# Patient Record
Sex: FEMALE | Race: WHITE | NOT HISPANIC OR LATINO | Employment: UNEMPLOYED | URBAN - NONMETROPOLITAN AREA
[De-identification: names, ages, dates, MRNs, and addresses within clinical notes are randomized per-mention and may not be internally consistent; named-entity substitution may affect disease eponyms.]

---

## 2017-10-23 ENCOUNTER — HOSPITAL ENCOUNTER (OUTPATIENT)
Dept: GENERAL RADIOLOGY | Facility: HOSPITAL | Age: 42
Discharge: HOME | End: 2017-10-23
Attending: INTERNAL MEDICINE | Admitting: INTERNAL MEDICINE

## 2017-10-23 DIAGNOSIS — R06.02 SOB (SHORTNESS OF BREATH): ICD-10-CM

## 2017-10-23 DIAGNOSIS — R06.02 SOB (SHORTNESS OF BREATH): Primary | ICD-10-CM

## 2017-10-23 PROCEDURE — 71020 HC CHEST PA AND LATERAL: CPT

## 2017-10-23 PROCEDURE — 71020 XR CHEST PA AND LATERAL: CPT | Performed by: RADIOLOGY

## 2017-10-24 ENCOUNTER — OFFICE VISIT (OUTPATIENT)
Dept: PULMONOLOGY | Facility: CLINIC | Age: 42
End: 2017-10-24

## 2017-10-24 VITALS
BODY MASS INDEX: 35.79 KG/M2 | DIASTOLIC BLOOD PRESSURE: 62 MMHG | OXYGEN SATURATION: 97 % | TEMPERATURE: 98.4 F | SYSTOLIC BLOOD PRESSURE: 100 MMHG | WEIGHT: 228 LBS | HEIGHT: 67 IN | HEART RATE: 86 BPM

## 2017-10-24 DIAGNOSIS — J44.9 CHRONIC OBSTRUCTIVE PULMONARY DISEASE, UNSPECIFIED COPD TYPE (HCC): Primary | ICD-10-CM

## 2017-10-24 LAB
FEV1: 2.07 LITERS
FVC VOL RESPIRATORY: 3.01 LITERS

## 2017-10-24 PROCEDURE — 99203 OFFICE O/P NEW LOW 30 MIN: CPT | Performed by: INTERNAL MEDICINE

## 2017-10-24 PROCEDURE — 94010 BREATHING CAPACITY TEST: CPT | Performed by: INTERNAL MEDICINE

## 2017-10-24 RX ORDER — DIAZEPAM 5 MG/1
5 TABLET ORAL 2 TIMES DAILY PRN
COMMUNITY

## 2017-10-24 RX ORDER — ASPIRIN 81 MG/1
TABLET ORAL
Refills: 4 | COMMUNITY
Start: 2017-10-07 | End: 2022-09-26

## 2017-10-24 RX ORDER — ALBUTEROL SULFATE 90 UG/1
AEROSOL, METERED RESPIRATORY (INHALATION)
Refills: 1 | COMMUNITY
Start: 2017-10-07 | End: 2022-09-26

## 2017-10-24 RX ORDER — ATORVASTATIN CALCIUM 80 MG/1
80 TABLET, FILM COATED ORAL DAILY
COMMUNITY
End: 2022-09-26

## 2017-10-24 RX ORDER — NITROGLYCERIN 2.5 MG/1
2.5 CAPSULE ORAL 3 TIMES DAILY
COMMUNITY
End: 2022-09-26

## 2017-10-24 RX ORDER — IBUPROFEN 200 MG
200 TABLET ORAL EVERY 6 HOURS PRN
COMMUNITY

## 2017-10-24 RX ORDER — CETIRIZINE HYDROCHLORIDE 10 MG/1
10 TABLET ORAL DAILY
COMMUNITY

## 2017-10-24 RX ORDER — ATORVASTATIN CALCIUM 80 MG/1
80 TABLET, FILM COATED ORAL DAILY
Refills: 4 | COMMUNITY
Start: 2017-10-07

## 2017-10-24 RX ORDER — ALBUTEROL SULFATE 0.63 MG/3ML
1 SOLUTION RESPIRATORY (INHALATION) EVERY 4 HOURS PRN
Refills: 1 | COMMUNITY
Start: 2017-10-07

## 2017-10-24 RX ORDER — PANTOPRAZOLE SODIUM 40 MG/1
TABLET, DELAYED RELEASE ORAL
Refills: 4 | COMMUNITY
Start: 2017-10-07 | End: 2022-09-26

## 2017-10-24 RX ORDER — AZELASTINE 1 MG/ML
1 SPRAY, METERED NASAL 2 TIMES DAILY
Refills: 4 | COMMUNITY
Start: 2017-10-07

## 2017-10-24 RX ORDER — TRIAMTERENE AND HYDROCHLOROTHIAZIDE 37.5; 25 MG/1; MG/1
1 TABLET ORAL EVERY MORNING
COMMUNITY

## 2017-10-24 RX ORDER — BUDESONIDE AND FORMOTEROL FUMARATE DIHYDRATE 160; 4.5 UG/1; UG/1
AEROSOL RESPIRATORY (INHALATION)
Refills: 1 | COMMUNITY
Start: 2017-10-07 | End: 2022-09-26

## 2017-10-24 RX ORDER — BUSPIRONE HYDROCHLORIDE 10 MG/1
10 TABLET ORAL 2 TIMES DAILY PRN
Refills: 4 | COMMUNITY
Start: 2017-10-07

## 2017-10-24 RX ORDER — SUCRALFATE 1 G/1
TABLET ORAL
Refills: 5 | COMMUNITY
Start: 2017-10-07 | End: 2022-09-26

## 2017-10-24 ASSESSMENT — PULMONARY FUNCTION TESTS
FVC: 3.01
FEV1: 2.07

## 2017-10-24 NOTE — PROGRESS NOTES
History of Present Illness 42-year-old female referred for evaluation of COPD.  She admits to smoking One pack a day for 27 years had a heart attack May 22 ended up with 2 stents in quit smoking for a while but started smoking again and currently smokes less than half a pack a day .  Her current  of 20 half years is a nonsmoker that she had to the other hospital and so smoked      Review of Systems cough and some shortness of breath occasional wheezing Taking Symbicort that causes her to have sore throat and uses nebulizer couple of times a day and albuterol inhaler as needed no chest pain palpitation or swelling in the legs review of system otherwise noncontributory    Active Problems:  Problem List Items Addressed This Visit     None      Visit Diagnoses     Chronic obstructive pulmonary disease, unspecified COPD type    -  Primary    Relevant Medications    albuterol (ACCUNEB) 0.63 MG/3ML nebulizer solution    VENTOLIN  (90 Base) MCG/ACT inhaler    SYMBICORT 160-4.5 MCG/ACT inhaler    azelastine (ASTELIN) 0.1 % nasal spray    cetirizine (zyrTEC) 10 MG tablet          Past Medical History:  Past Medical History:   Diagnosis Date   • CAD (coronary artery disease)    • COPD (chronic obstructive pulmonary disease)    • Esophageal ulcer    • Heart attack        Family History:  Family History   Problem Relation Age of Onset   • Cancer Mother    • Diabetes Mother    • Emphysema Mother    • Heart disease Mother    • Hypertension Mother    • Diabetes Father    • Heart disease Father        Social History:  Social History   Substance Use Topics   • Smoking status: Current Every Day Smoker     Packs/day: 1.00     Years: 27.00     Types: Cigarettes   • Smokeless tobacco: Never Used   • Alcohol use No       Current Medications:  Current Outpatient Prescriptions   Medication Sig Dispense Refill   • albuterol (ACCUNEB) 0.63 MG/3ML nebulizer solution   1   • ASPIR-LOW 81 MG EC tablet   4   • atorvastatin (LIPITOR)  "80 MG tablet   4   • atorvastatin (LIPITOR) 80 MG tablet Take 80 mg by mouth Daily.     • azelastine (ASTELIN) 0.1 % nasal spray   4   • busPIRone (BUSPAR) 10 MG tablet   4   • Calcium Carbonate (CALTRATE 600 PO) Take  by mouth.     • cetirizine (zyrTEC) 10 MG tablet Take 10 mg by mouth Daily.     • diazePAM (VALIUM) 5 MG tablet Take 5 mg by mouth 2 (Two) Times a Day As Needed for Anxiety.     • ibuprofen (ADVIL,MOTRIN) 200 MG tablet Take 200 mg by mouth Every 6 (Six) Hours As Needed for Mild Pain .     • metoprolol tartrate (LOPRESSOR) 25 MG tablet Take 25 mg by mouth 2 (Two) Times a Day.     • nitroglycerin (NITRO-BID) 2.5 MG CR capsule Take 2.5 mg by mouth 3 (Three) Times a Day.     • pantoprazole (PROTONIX) 40 MG EC tablet   4   • sucralfate (CARAFATE) 1 g tablet   5   • SYMBICORT 160-4.5 MCG/ACT inhaler   1   • ticagrelor (BRILINTA) 90 MG tablet tablet Take 90 mg by mouth 2 (Two) Times a Day.     • triamterene-hydrochlorothiazide (DYAZIDE) 37.5-25 MG per capsule Take 1 capsule by mouth Every Morning.     • VENTOLIN  (90 Base) MCG/ACT inhaler   1     No current facility-administered medications for this visit.        Allergies:  Allergies   Allergen Reactions   • Keflex [Cephalexin]        Vitals:  /62  Pulse 86  Temp 98.4 °F (36.9 °C) (Oral)   Ht 67\" (170.2 cm)  Wt 228 lb (103 kg)  SpO2 97%  BMI 35.71 kg/m2    Physical Exam slightly overweight in no acute distress vital signs is stable minimal expiratory rhonchi heart regular no clubbing cyanosis or edema    Imaging: Chest x-ray clear no cardiomegaly    PFT: PFT shows mild obstructive impairment with FVC of 75 FEV1 64%  No results found for this or any previous visit.        ASSESSMENT AND DIAGNOSIS:1-COPD mild and not better with medications because of continued smoking2-coronary artery disease myocardial infarction and stenting  Recommendations strongly urged her to quit smoking She has used NicoDerm patches in the past and seems to be " motivated to do the same  Advised her to continue Symbicort 2 puffs twice a day and rinse mouth afterwards and continue albuterol Inhaler nebs as needed    Follow-Up: Return in 2 months with a PFT and will put that she Can quit smoking and may need additional medications if not better

## 2022-09-22 ENCOUNTER — HOSPITAL ENCOUNTER (EMERGENCY)
Facility: HOSPITAL | Age: 47
Discharge: HOME | End: 2022-09-22
Attending: EMERGENCY MEDICINE | Admitting: EMERGENCY MEDICINE

## 2022-09-22 ENCOUNTER — APPOINTMENT (OUTPATIENT)
Dept: GENERAL RADIOLOGY | Facility: HOSPITAL | Age: 47
End: 2022-09-22

## 2022-09-22 VITALS
TEMPERATURE: 97.9 F | SYSTOLIC BLOOD PRESSURE: 139 MMHG | HEART RATE: 89 BPM | WEIGHT: 195 LBS | RESPIRATION RATE: 16 BRPM | HEIGHT: 67 IN | DIASTOLIC BLOOD PRESSURE: 82 MMHG | OXYGEN SATURATION: 99 % | BODY MASS INDEX: 30.61 KG/M2

## 2022-09-22 DIAGNOSIS — R07.9 CHEST PAIN, UNSPECIFIED TYPE: Primary | ICD-10-CM

## 2022-09-22 DIAGNOSIS — Z86.79 HISTORY OF CORONARY ARTERY DISEASE: ICD-10-CM

## 2022-09-22 LAB
ALBUMIN SERPL-MCNC: 4 G/DL (ref 3.5–5.2)
ALBUMIN/GLOB SERPL: 1.2 G/DL
ALP SERPL-CCNC: 101 U/L (ref 39–117)
ALT SERPL W P-5'-P-CCNC: 21 U/L (ref 1–33)
ANION GAP SERPL CALCULATED.3IONS-SCNC: 8 MMOL/L (ref 5–15)
AST SERPL-CCNC: 21 U/L (ref 1–32)
BASOPHILS # BLD AUTO: 0.04 10*3/MM3 (ref 0–0.2)
BASOPHILS NFR BLD AUTO: 0.4 % (ref 0–1.5)
BILIRUB SERPL-MCNC: 0.5 MG/DL (ref 0–1.2)
BUN SERPL-MCNC: 9 MG/DL (ref 6–20)
BUN/CREAT SERPL: 14.5 (ref 7–25)
CALCIUM SPEC-SCNC: 9.5 MG/DL (ref 8.6–10.5)
CHLORIDE SERPL-SCNC: 100 MMOL/L (ref 98–107)
CO2 SERPL-SCNC: 31 MMOL/L (ref 22–29)
CREAT SERPL-MCNC: 0.62 MG/DL (ref 0.57–1)
DEPRECATED RDW RBC AUTO: 48 FL (ref 37–54)
EGFRCR SERPLBLD CKD-EPI 2021: 110 ML/MIN/1.73
EOSINOPHIL # BLD AUTO: 0.13 10*3/MM3 (ref 0–0.4)
EOSINOPHIL NFR BLD AUTO: 1.2 % (ref 0.3–6.2)
ERYTHROCYTE [DISTWIDTH] IN BLOOD BY AUTOMATED COUNT: 14.5 % (ref 12.3–15.4)
GLOBULIN UR ELPH-MCNC: 3.3 GM/DL
GLUCOSE SERPL-MCNC: 103 MG/DL (ref 65–99)
HCT VFR BLD AUTO: 44.4 % (ref 34–46.6)
HGB BLD-MCNC: 15.2 G/DL (ref 12–15.9)
HOLD SPECIMEN: NORMAL
IMM GRANULOCYTES # BLD AUTO: 0.02 10*3/MM3 (ref 0–0.05)
IMM GRANULOCYTES NFR BLD AUTO: 0.2 % (ref 0–0.5)
LIPASE SERPL-CCNC: 44 U/L (ref 13–60)
LYMPHOCYTES # BLD AUTO: 2.41 10*3/MM3 (ref 0.7–3.1)
LYMPHOCYTES NFR BLD AUTO: 22.7 % (ref 19.6–45.3)
MCH RBC QN AUTO: 31.2 PG (ref 26.6–33)
MCHC RBC AUTO-ENTMCNC: 34.2 G/DL (ref 31.5–35.7)
MCV RBC AUTO: 91.2 FL (ref 79–97)
MONOCYTES # BLD AUTO: 1.1 10*3/MM3 (ref 0.1–0.9)
MONOCYTES NFR BLD AUTO: 10.4 % (ref 5–12)
NEUTROPHILS NFR BLD AUTO: 6.92 10*3/MM3 (ref 1.7–7)
NEUTROPHILS NFR BLD AUTO: 65.1 % (ref 42.7–76)
NRBC BLD AUTO-RTO: 0 /100 WBC (ref 0–0.2)
NT-PROBNP SERPL-MCNC: 20.8 PG/ML (ref 0–450)
PLATELET # BLD AUTO: 277 10*3/MM3 (ref 140–450)
PMV BLD AUTO: 10.3 FL (ref 6–12)
POTASSIUM SERPL-SCNC: 3.9 MMOL/L (ref 3.5–5.2)
PROT SERPL-MCNC: 7.3 G/DL (ref 6–8.5)
QT INTERVAL: 352 MS
QTC INTERVAL: 442 MS
RBC # BLD AUTO: 4.87 10*6/MM3 (ref 3.77–5.28)
SODIUM SERPL-SCNC: 139 MMOL/L (ref 136–145)
TROPONIN T SERPL-MCNC: <0.01 NG/ML (ref 0–0.03)
WBC NRBC COR # BLD: 10.62 10*3/MM3 (ref 3.4–10.8)
WHOLE BLOOD HOLD COAG: NORMAL
WHOLE BLOOD HOLD SPECIMEN: NORMAL

## 2022-09-22 PROCEDURE — 93005 ELECTROCARDIOGRAM TRACING: CPT

## 2022-09-22 PROCEDURE — 80053 COMPREHEN METABOLIC PANEL: CPT

## 2022-09-22 PROCEDURE — 71045 X-RAY EXAM CHEST 1 VIEW: CPT

## 2022-09-22 PROCEDURE — 83690 ASSAY OF LIPASE: CPT

## 2022-09-22 PROCEDURE — 85025 COMPLETE CBC W/AUTO DIFF WBC: CPT | Performed by: EMERGENCY MEDICINE

## 2022-09-22 PROCEDURE — 93005 ELECTROCARDIOGRAM TRACING: CPT | Performed by: EMERGENCY MEDICINE

## 2022-09-22 PROCEDURE — 83880 ASSAY OF NATRIURETIC PEPTIDE: CPT

## 2022-09-22 PROCEDURE — 99283 EMERGENCY DEPT VISIT LOW MDM: CPT

## 2022-09-22 PROCEDURE — 84484 ASSAY OF TROPONIN QUANT: CPT

## 2022-09-22 RX ORDER — ASPIRIN 81 MG/1
324 TABLET, CHEWABLE ORAL ONCE
Status: DISCONTINUED | OUTPATIENT
Start: 2022-09-22 | End: 2022-09-22 | Stop reason: HOSPADM

## 2022-09-22 RX ORDER — SODIUM CHLORIDE 0.9 % (FLUSH) 0.9 %
10 SYRINGE (ML) INJECTION AS NEEDED
Status: DISCONTINUED | OUTPATIENT
Start: 2022-09-22 | End: 2022-09-22 | Stop reason: HOSPADM

## 2022-09-22 RX ORDER — KETOROLAC TROMETHAMINE 15 MG/ML
15 INJECTION, SOLUTION INTRAMUSCULAR; INTRAVENOUS ONCE
Status: DISCONTINUED | OUTPATIENT
Start: 2022-09-22 | End: 2022-09-22 | Stop reason: HOSPADM

## 2022-09-23 NOTE — ED PROVIDER NOTES
"Subjective   History of Present Illness  47-year-old female presents for evaluation of chest pain.  The patient has a history of coronary artery disease.  She states that her pain started 3 days ago immediately after having her COVID-19 vaccination.  The pain is right-sided and seems intermittent in nature.  She denies any accompanying rash.  The pain seems to be worse with palpation and with movement.  She denies any cough or fever.  Given her symptoms, she wanted to ensure that the pain was not related to her \"heart\" and came to the ED to be evaluated.  She has no other complaints at this time.  She otherwise feels well.        Review of Systems   Cardiovascular: Positive for chest pain.   All other systems reviewed and are negative.      Past Medical History:   Diagnosis Date   • CAD (coronary artery disease)    • COPD (chronic obstructive pulmonary disease) (CMS/HCC)    • Esophageal ulcer    • Heart attack        Allergies   Allergen Reactions   • Keflex [Cephalexin]        Past Surgical History:   Procedure Laterality Date   • CARPAL TUNNEL RELEASE     •  SECTION     • D & C AND LAPAROSCOPY     • TENDON REPAIR         Family History   Problem Relation Age of Onset   • Cancer Mother    • Diabetes Mother    • Emphysema Mother    • Heart disease Mother    • Hypertension Mother    • Diabetes Father    • Heart disease Father        Social History     Socioeconomic History   • Marital status:    Tobacco Use   • Smoking status: Current Every Day Smoker     Packs/day: 1.00     Years: 27.00     Pack years: 27.00     Types: Cigarettes   • Smokeless tobacco: Never Used   Substance and Sexual Activity   • Alcohol use: No   • Drug use: No   • Sexual activity: Defer           Objective   Physical Exam  Vitals and nursing note reviewed.   Constitutional:       General: She is not in acute distress.     Appearance: She is well-developed. She is not diaphoretic.      Comments: Well-appearing female in no acute " distress   HENT:      Head: Normocephalic and atraumatic.   Eyes:      Pupils: Pupils are equal, round, and reactive to light.   Cardiovascular:      Rate and Rhythm: Normal rate and regular rhythm.      Heart sounds: Normal heart sounds. No murmur heard.    No friction rub. No gallop.   Pulmonary:      Effort: Pulmonary effort is normal. No respiratory distress.      Breath sounds: Normal breath sounds. No wheezing or rales.   Abdominal:      General: Bowel sounds are normal. There is no distension.      Palpations: Abdomen is soft. There is no mass.      Tenderness: There is no abdominal tenderness. There is no guarding or rebound.   Musculoskeletal:         General: Normal range of motion.      Right lower leg: No edema.      Left lower leg: No edema.   Skin:     General: Skin is warm and dry.      Findings: No erythema or rash.   Neurological:      General: No focal deficit present.      Mental Status: She is alert and oriented to person, place, and time.   Psychiatric:         Mood and Affect: Mood normal.         Thought Content: Thought content normal.         Judgment: Judgment normal.         Procedures           ED Course  ED Course as of 09/22/22 2028   Thu Sep 22, 2022   1438 47-year-old female with a history of coronary artery disease presents for evaluation of chest pain for the past 3 days after receiving her COVID-19 vaccination on September 19.  On arrival to the ED, the patient is very well-appearing.  Benign exam.  Labs are bland.  Low risk Well's and PERC negative. [DD]   1438 EKG revealed normal sinus rhythm with a heart rate of 95 and no ST segments suggestive of or concerning for ischemia. [DD]   1633 I personally viewed the patient's x-ray images myself, and I am in agreement with the radiologist's reading for final interpretation.     [DD]   1633 Doubt ACS, PE, dissection, or emergent cardiothoracic process at this time based on exam, history, clinical presentation, gestalt, objective  findings in the ED, and risk stratification.  HEART score of 3.  The patient will follow up with the chest pain clinic within the next 72 hours for further outpatient work-up and evaluation.  Agreeable with plan and given appropriate strict return precautions.     [DD]      ED Course User Index  [DD] Pawan Ward MD                                          Recent Results (from the past 24 hour(s))   ECG 12 Lead    Collection Time: 09/22/22  1:40 PM   Result Value Ref Range    QT Interval 352 ms    QTC Interval 442 ms   Troponin    Collection Time: 09/22/22  1:46 PM    Specimen: Blood   Result Value Ref Range    Troponin T <0.010 0.000 - 0.030 ng/mL   Comprehensive Metabolic Panel    Collection Time: 09/22/22  1:46 PM    Specimen: Blood   Result Value Ref Range    Glucose 103 (H) 65 - 99 mg/dL    BUN 9 6 - 20 mg/dL    Creatinine 0.62 0.57 - 1.00 mg/dL    Sodium 139 136 - 145 mmol/L    Potassium 3.9 3.5 - 5.2 mmol/L    Chloride 100 98 - 107 mmol/L    CO2 31.0 (H) 22.0 - 29.0 mmol/L    Calcium 9.5 8.6 - 10.5 mg/dL    Total Protein 7.3 6.0 - 8.5 g/dL    Albumin 4.00 3.50 - 5.20 g/dL    ALT (SGPT) 21 1 - 33 U/L    AST (SGOT) 21 1 - 32 U/L    Alkaline Phosphatase 101 39 - 117 U/L    Total Bilirubin 0.5 0.0 - 1.2 mg/dL    Globulin 3.3 gm/dL    A/G Ratio 1.2 g/dL    BUN/Creatinine Ratio 14.5 7.0 - 25.0    Anion Gap 8.0 5.0 - 15.0 mmol/L    eGFR 110.0 >60.0 mL/min/1.73   Lipase    Collection Time: 09/22/22  1:46 PM    Specimen: Blood   Result Value Ref Range    Lipase 44 13 - 60 U/L   BNP    Collection Time: 09/22/22  1:46 PM    Specimen: Blood   Result Value Ref Range    proBNP 20.8 0.0 - 450.0 pg/mL   Green Top (Gel)    Collection Time: 09/22/22  1:46 PM   Result Value Ref Range    Extra Tube Hold for add-ons.    Lavender Top    Collection Time: 09/22/22  1:46 PM   Result Value Ref Range    Extra Tube hold for add-on    Gold Top - SST    Collection Time: 09/22/22  1:46 PM   Result Value Ref Range    Extra Tube Hold  for add-ons.    Gray Top    Collection Time: 09/22/22  1:46 PM   Result Value Ref Range    Extra Tube Hold for add-ons.    Light Blue Top    Collection Time: 09/22/22  1:46 PM   Result Value Ref Range    Extra Tube Hold for add-ons.    CBC Auto Differential    Collection Time: 09/22/22  1:46 PM    Specimen: Blood   Result Value Ref Range    WBC 10.62 3.40 - 10.80 10*3/mm3    RBC 4.87 3.77 - 5.28 10*6/mm3    Hemoglobin 15.2 12.0 - 15.9 g/dL    Hematocrit 44.4 34.0 - 46.6 %    MCV 91.2 79.0 - 97.0 fL    MCH 31.2 26.6 - 33.0 pg    MCHC 34.2 31.5 - 35.7 g/dL    RDW 14.5 12.3 - 15.4 %    RDW-SD 48.0 37.0 - 54.0 fl    MPV 10.3 6.0 - 12.0 fL    Platelets 277 140 - 450 10*3/mm3    Neutrophil % 65.1 42.7 - 76.0 %    Lymphocyte % 22.7 19.6 - 45.3 %    Monocyte % 10.4 5.0 - 12.0 %    Eosinophil % 1.2 0.3 - 6.2 %    Basophil % 0.4 0.0 - 1.5 %    Immature Grans % 0.2 0.0 - 0.5 %    Neutrophils, Absolute 6.92 1.70 - 7.00 10*3/mm3    Lymphocytes, Absolute 2.41 0.70 - 3.10 10*3/mm3    Monocytes, Absolute 1.10 (H) 0.10 - 0.90 10*3/mm3    Eosinophils, Absolute 0.13 0.00 - 0.40 10*3/mm3    Basophils, Absolute 0.04 0.00 - 0.20 10*3/mm3    Immature Grans, Absolute 0.02 0.00 - 0.05 10*3/mm3    nRBC 0.0 0.0 - 0.2 /100 WBC     Note: In addition to lab results from this visit, the labs listed above may include labs taken at another facility or during a different encounter within the last 24 hours. Please correlate lab times with ED admission and discharge times for further clarification of the services performed during this visit.    XR Chest 1 View   Final Result   No acute cardiopulmonary findings.       This report was finalized on 9/22/2022 4:18 PM by Jose D Negro MD.            Vitals:    09/22/22 1331 09/22/22 1659   BP: 145/84 139/82   BP Location: Left arm    Patient Position: Sitting    Pulse: 90 89   Resp: 18 16   Temp: 97.9 °F (36.6 °C) 97.9 °F (36.6 °C)   TempSrc: Oral Oral   SpO2: 98% 99%   Weight: 88.5 kg (195 lb)   "  Height: 170.2 cm (67\")      Medications - No data to display  ECG/EMG Results (last 24 hours)     ** No results found for the last 24 hours. **        ECG 12 Lead   Final Result   Test Reason : chest pain   Blood Pressure :   */*   mmHG   Vent. Rate :  95 BPM     Atrial Rate :  95 BPM      P-R Int : 180 ms          QRS Dur :  86 ms       QT Int : 352 ms       P-R-T Axes :  65  -4  50 degrees      QTc Int : 442 ms      Normal sinus rhythm   Anterior infarct , age undetermined   Abnormal ECG   No previous ECGs available   Confirmed by MD Ward Michael ([id number]) on 9/22/2022 7:22:15 PM      Referred By:            Confirmed By: Mark Ward MD              [facility]    Final diagnoses:   Chest pain, unspecified type   History of coronary artery disease       ED Disposition  ED Disposition     ED Disposition   Discharge    Condition   Stable    Comment   --             [facility] CARDIOLOG[facility]  [address]  [place] [id number] [phone]  In 3 days           Medication List      No changes were made to your prescriptions during this visit.          Pawan Ward MD  09/22/22 2029    "

## 2022-09-26 ENCOUNTER — OFFICE VISIT (OUTPATIENT)
Dept: CARDIOLOGY | Facility: HOSPITAL | Age: 47
End: 2022-09-26

## 2022-09-26 VITALS
BODY MASS INDEX: 30.93 KG/M2 | OXYGEN SATURATION: 98 % | SYSTOLIC BLOOD PRESSURE: 119 MMHG | DIASTOLIC BLOOD PRESSURE: 70 MMHG | TEMPERATURE: 96.6 F | WEIGHT: 197.06 LBS | RESPIRATION RATE: 20 BRPM | HEIGHT: 67 IN | HEART RATE: 83 BPM

## 2022-09-26 DIAGNOSIS — I25.118 CORONARY ARTERY DISEASE OF NATIVE ARTERY OF NATIVE HEART WITH STABLE ANGINA PECTORIS: ICD-10-CM

## 2022-09-26 DIAGNOSIS — R94.31 ABNORMAL EKG: ICD-10-CM

## 2022-09-26 DIAGNOSIS — J44.9 CHRONIC OBSTRUCTIVE PULMONARY DISEASE, UNSPECIFIED COPD TYPE: ICD-10-CM

## 2022-09-26 DIAGNOSIS — Z72.0 TOBACCO ABUSE: ICD-10-CM

## 2022-09-26 DIAGNOSIS — R06.09 DOE (DYSPNEA ON EXERTION): ICD-10-CM

## 2022-09-26 DIAGNOSIS — R07.2 PRECORDIAL PAIN: Primary | ICD-10-CM

## 2022-09-26 DIAGNOSIS — R53.83 OTHER FATIGUE: ICD-10-CM

## 2022-09-26 PROCEDURE — 99204 OFFICE O/P NEW MOD 45 MIN: CPT | Performed by: NURSE PRACTITIONER

## 2022-09-26 RX ORDER — METFORMIN HYDROCHLORIDE 500 MG/1
1000 TABLET, EXTENDED RELEASE ORAL 2 TIMES DAILY
COMMUNITY

## 2022-09-26 RX ORDER — MULTIVIT,CALC,MINS/IRON/FOLIC 500-18-0.4
1 TABLET ORAL DAILY
COMMUNITY

## 2022-09-26 RX ORDER — MELATONIN
1000 DAILY
COMMUNITY

## 2022-09-26 RX ORDER — SEMAGLUTIDE 1.34 MG/ML
0.5 INJECTION, SOLUTION SUBCUTANEOUS WEEKLY
COMMUNITY
Start: 2022-07-19

## 2022-09-26 RX ORDER — ASPIRIN 81 MG/1
81 TABLET ORAL DAILY
Qty: 30 TABLET | Refills: 3 | Status: SHIPPED | OUTPATIENT
Start: 2022-09-26

## 2022-09-26 RX ORDER — ASCORBIC ACID 500 MG
500 TABLET ORAL DAILY
COMMUNITY

## 2022-09-26 RX ORDER — ICOSAPENT ETHYL 1000 MG/1
2 CAPSULE ORAL 2 TIMES DAILY WITH MEALS
COMMUNITY

## 2022-09-26 RX ORDER — LANOLIN ALCOHOL/MO/W.PET/CERES
1000 CREAM (GRAM) TOPICAL DAILY
COMMUNITY

## 2022-09-26 RX ORDER — CLOPIDOGREL BISULFATE 75 MG/1
75 TABLET ORAL DAILY
COMMUNITY

## 2022-09-26 NOTE — PROGRESS NOTES
"Chief Complaint  Chest Pain and Establish Care    Subjective    History of Present Illness {CC  Problem List  Visit  Diagnosis   Encounters  Notes  Medications  Labs  Result Review Imaging  Media :23}       History of Present Illness   D7-year-old female presents the office today for ongoing evaluation of her chest pain.  Patient has a history of depression, GERD, COPD, hypertension, dyslipidemia and CAD.  Patient had non-ST elevation MI May 22, 2017 and was taken to the Cath Lab with successful OLE of the distal LAD extending to the mid LAD by Dr Joyner. Patient notes that she has not followed up with Cardiology since 2017. She notes that her pcp refills her medications.  Patient reports that she took her self off aspirin because she was concerned about bruising in her arms.  She does report she is still taking her Plavix.  She reports chest pain worse with exertion, dyspnea, fatigue. Patient is a smoker and is smoking 1 ppd x 27 years.   Objective     Vital Signs:   Vitals:    09/26/22 0833 09/26/22 0835 09/26/22 0836   BP: 119/77 106/87 119/70   BP Location: Right arm Left arm Left arm   Patient Position: Sitting Standing Sitting   Cuff Size: Adult Adult Adult   Pulse: 77 80 83   Resp:   20   Temp:   96.6 °F (35.9 °C)   TempSrc:   Temporal   SpO2: 97% 99% 98%   Weight:   89.4 kg (197 lb 1 oz)   Height:   170.2 cm (67\")     Body mass index is 30.86 kg/m².  Physical Exam  Vitals and nursing note reviewed.   Constitutional:       Appearance: Normal appearance.   HENT:      Head: Normocephalic.   Eyes:      Pupils: Pupils are equal, round, and reactive to light.   Cardiovascular:      Rate and Rhythm: Normal rate and regular rhythm.      Pulses: Normal pulses.      Heart sounds: Normal heart sounds. No murmur heard.  Pulmonary:      Effort: Pulmonary effort is normal.      Breath sounds: Normal breath sounds.   Abdominal:      General: Bowel sounds are normal.      Palpations: Abdomen is soft. "   Musculoskeletal:         General: Normal range of motion.      Cervical back: Normal range of motion.      Right lower leg: No edema.      Left lower leg: No edema.   Skin:     General: Skin is warm and dry.      Capillary Refill: Capillary refill takes less than 2 seconds.   Neurological:      Mental Status: She is alert and oriented to person, place, and time.   Psychiatric:         Mood and Affect: Mood normal.         Thought Content: Thought content normal.              Result Review  Data Reviewed:{ Labs  Result Review  Imaging  Med Tab  Media :23}   Reviewed records [facility] 2017    Vent. Rate :  95 BPM     Atrial Rate :  95 BPM     P-R Int : 180 ms          QRS Dur :  86 ms      QT Int : 352 ms       P-R-T Axes :  65  -4  50 degrees     QTc Int : 442 ms     Normal sinus rhythm  Anterior infarct , age undetermined  Abnormal ECG  No previous ECGs available  Confirmed by MD Ward Michael ([id number]) on 9/22/2022 7:22:15 PM                 Confirmed By: Mark Ward MD    Troponin (09/22/2022 13:46)  CBC & Differential (09/22/2022 13:46)  Comprehensive Metabolic Panel (09/22/2022 13:46)  Lipase (09/22/2022 13:46)  BNP (09/22/2022 13:46)       Assessment and Plan {CC Problem List  Visit Diagnosis  ROS  Review (Popup)  Health Maintenance  Quality  BestPractice  Medications  SmartSets  SnapShot Encounters  Media :23}   1. Precordial pain  sandip score 2  - Stress Test With Myocardial Perfusion (1 Day); Future    2. BARBA (dyspnea on exertion)    - Stress Test With Myocardial Perfusion (1 Day); Future    3. Coronary artery disease of native artery of native heart with stable angina pectoris (HCC)  Resume asa 81 mg daily  Continue plavix, metoprolol, lipitor   - Stress Test With Myocardial Perfusion (1 Day); Future    4. Chronic obstructive pulmonary disease, unspecified COPD type (HCC)  Currently without exacerbation  - Stress Test With Myocardial Perfusion (1 Day); Future    5. Other fatigue    -  Stress Test With Myocardial Perfusion (1 Day); Future    6. Abnormal EKG    - Stress Test With Myocardial Perfusion (1 Day); Future    7. Tobacco abuse  Smoking cessation counseling declined today   - Stress Test With Myocardial Perfusion (1 Day); Future        Follow Up {Instructions Charge Capture  Follow-up Communications :23}   Return if symptoms worsen or fail to improve.    Patient was given instructions and counseling regarding her condition or for health maintenance advice. Please see specific information pulled into the AVS if appropriate.  Patient was instructed to call the [facility] with any questions, concerns, or worsening symptoms.

## 2022-09-28 ENCOUNTER — HOSPITAL ENCOUNTER (OUTPATIENT)
Dept: CARDIOLOGY | Facility: HOSPITAL | Age: 47
Discharge: HOME | End: 2022-09-28
Admitting: NURSE PRACTITIONER

## 2022-09-28 ENCOUNTER — APPOINTMENT (OUTPATIENT)
Dept: CARDIOLOGY | Facility: HOSPITAL | Age: 47
End: 2022-09-28

## 2022-09-28 VITALS
HEART RATE: 81 BPM | OXYGEN SATURATION: 97 % | HEIGHT: 67 IN | DIASTOLIC BLOOD PRESSURE: 79 MMHG | BODY MASS INDEX: 30.93 KG/M2 | SYSTOLIC BLOOD PRESSURE: 121 MMHG | WEIGHT: 197.09 LBS

## 2022-09-28 DIAGNOSIS — Z72.0 TOBACCO ABUSE: ICD-10-CM

## 2022-09-28 DIAGNOSIS — R53.83 OTHER FATIGUE: ICD-10-CM

## 2022-09-28 DIAGNOSIS — J44.9 CHRONIC OBSTRUCTIVE PULMONARY DISEASE, UNSPECIFIED COPD TYPE: ICD-10-CM

## 2022-09-28 DIAGNOSIS — R07.2 PRECORDIAL PAIN: ICD-10-CM

## 2022-09-28 DIAGNOSIS — R06.09 DOE (DYSPNEA ON EXERTION): ICD-10-CM

## 2022-09-28 DIAGNOSIS — I25.118 CORONARY ARTERY DISEASE OF NATIVE ARTERY OF NATIVE HEART WITH STABLE ANGINA PECTORIS: ICD-10-CM

## 2022-09-28 DIAGNOSIS — R94.31 ABNORMAL EKG: ICD-10-CM

## 2022-09-28 PROCEDURE — 93018 CV STRESS TEST I&R ONLY: CPT | Performed by: INTERNAL MEDICINE

## 2022-09-28 PROCEDURE — 25010000002 REGADENOSON 0.4 MG/5ML SOLUTION: Performed by: NURSE PRACTITIONER

## 2022-09-28 PROCEDURE — A9500 TC99M SESTAMIBI: HCPCS | Performed by: NURSE PRACTITIONER

## 2022-09-28 PROCEDURE — 93017 CV STRESS TEST TRACING ONLY: CPT

## 2022-09-28 PROCEDURE — 0 TECHNETIUM SESTAMIBI: Performed by: NURSE PRACTITIONER

## 2022-09-28 PROCEDURE — 78452 HT MUSCLE IMAGE SPECT MULT: CPT | Performed by: INTERNAL MEDICINE

## 2022-09-28 PROCEDURE — 78452 HT MUSCLE IMAGE SPECT MULT: CPT

## 2022-09-28 RX ADMIN — TECHNETIUM TC 99M SESTAMIBI 1 DOSE: 1 INJECTION INTRAVENOUS at 08:05

## 2022-09-28 RX ADMIN — TECHNETIUM TC 99M SESTAMIBI 1 DOSE: 1 INJECTION INTRAVENOUS at 09:55

## 2022-09-28 RX ADMIN — REGADENOSON 0.4 MG: 0.08 INJECTION, SOLUTION INTRAVENOUS at 09:53

## 2022-09-29 LAB
BH CV REST NUCLEAR ISOTOPE DOSE: 9.4 MCI
BH CV STRESS BP STAGE 2: NORMAL
BH CV STRESS BP STAGE 4: NORMAL
BH CV STRESS COMMENTS STAGE 1: NORMAL
BH CV STRESS DOSE REGADENOSON STAGE 1: 0.4
BH CV STRESS DURATION MIN STAGE 1: 1
BH CV STRESS DURATION MIN STAGE 2: 1
BH CV STRESS DURATION MIN STAGE 3: 1
BH CV STRESS DURATION MIN STAGE 4: 1
BH CV STRESS DURATION SEC STAGE 1: 10
BH CV STRESS DURATION SEC STAGE 2: 0
BH CV STRESS HR STAGE 1: 111
BH CV STRESS HR STAGE 2: 134
BH CV STRESS HR STAGE 3: 130
BH CV STRESS HR STAGE 4: 126
BH CV STRESS NUCLEAR ISOTOPE DOSE: 32.2 MCI
BH CV STRESS O2 STAGE 1: 98
BH CV STRESS O2 STAGE 2: 97
BH CV STRESS O2 STAGE 3: 98
BH CV STRESS O2 STAGE 4: 98
BH CV STRESS PROTOCOL 1: NORMAL
BH CV STRESS RECOVERY BP: NORMAL MMHG
BH CV STRESS RECOVERY HR: 103 BPM
BH CV STRESS RECOVERY O2: 97 %
BH CV STRESS STAGE 1: 1
BH CV STRESS STAGE 2: 2
BH CV STRESS STAGE 3: 3
BH CV STRESS STAGE 4: 4
LV EF NUC BP: 80 %
MAXIMAL PREDICTED HEART RATE: 172 BPM
PERCENT MAX PREDICTED HR: 77.91 %
STRESS BASELINE BP: NORMAL MMHG
STRESS BASELINE HR: 83 BPM
STRESS O2 SAT REST: 97 %
STRESS PERCENT HR: 92 %
STRESS POST ESTIMATED WORKLOAD: 1 METS
STRESS POST EXERCISE DUR MIN: 4 MIN
STRESS POST EXERCISE DUR SEC: 0 SEC
STRESS POST O2 SAT PEAK: 97 %
STRESS POST PEAK BP: NORMAL MMHG
STRESS POST PEAK HR: 134 BPM
STRESS TARGET HR: 146 BPM

## 2022-09-30 NOTE — PROGRESS NOTES
I have reviewed your nuclear stress test results.  Your results are acceptable.   There is no evidence of blockage in the arteries of your heart.  Your heart contracts normally.    As you probably know stress testing is not perfect.  There is an 85% likelihood the nuclear stress test has given us accurate results. There are both false negatives and false positives with any type of stress testing.  If you continue to have symptoms please contact my office or let your primary care provider know.    Please see your primary care provider on a regular basis.  It is important to have good blood pressure, cholesterol, and blood glucose control, as well as maintaining a healthy weight.  I encourage you to increase your daily physical exercise and modify your diet for weight loss.  An outpatient dietitian consult is available if needed.

## 2022-10-10 ENCOUNTER — APPOINTMENT (OUTPATIENT)
Dept: CARDIOLOGY | Facility: HOSPITAL | Age: 47
End: 2022-10-10

## 2022-10-13 ENCOUNTER — TELEPHONE (OUTPATIENT)
Dept: CARDIOLOGY | Facility: HOSPITAL | Age: 47
End: 2022-10-13

## 2022-10-13 DIAGNOSIS — R07.2 PRECORDIAL PAIN: Primary | ICD-10-CM

## 2022-10-13 DIAGNOSIS — J44.9 CHRONIC OBSTRUCTIVE PULMONARY DISEASE, UNSPECIFIED COPD TYPE: ICD-10-CM

## 2022-10-13 DIAGNOSIS — Z72.0 TOBACCO ABUSE: ICD-10-CM

## 2022-10-13 DIAGNOSIS — I25.10 CORONARY ARTERY DISEASE INVOLVING NATIVE CORONARY ARTERY OF NATIVE HEART WITHOUT ANGINA PECTORIS: ICD-10-CM

## 2024-02-13 ENCOUNTER — OFFICE VISIT (OUTPATIENT)
Dept: CARDIOLOGY | Facility: CLINIC | Age: 49
End: 2024-02-13
Payer: COMMERCIAL

## 2024-02-13 ENCOUNTER — TELEPHONE (OUTPATIENT)
Dept: CARDIOLOGY | Facility: CLINIC | Age: 49
End: 2024-02-13
Payer: COMMERCIAL

## 2024-02-13 VITALS
DIASTOLIC BLOOD PRESSURE: 70 MMHG | OXYGEN SATURATION: 98 % | WEIGHT: 160 LBS | SYSTOLIC BLOOD PRESSURE: 108 MMHG | HEIGHT: 67 IN | BODY MASS INDEX: 25.11 KG/M2 | HEART RATE: 80 BPM

## 2024-02-13 DIAGNOSIS — R01.1 HEART MURMUR: ICD-10-CM

## 2024-02-13 DIAGNOSIS — I25.10 CORONARY ARTERY DISEASE DUE TO CALCIFIED CORONARY LESION: Primary | ICD-10-CM

## 2024-02-13 DIAGNOSIS — I25.84 CORONARY ARTERY DISEASE DUE TO CALCIFIED CORONARY LESION: Primary | ICD-10-CM

## 2024-02-13 DIAGNOSIS — E78.5 HYPERLIPIDEMIA LDL GOAL <70: ICD-10-CM

## 2024-02-13 DIAGNOSIS — I10 ESSENTIAL HYPERTENSION: ICD-10-CM

## 2024-02-13 DIAGNOSIS — R00.2 PALPITATIONS: ICD-10-CM

## 2024-02-13 DIAGNOSIS — I21.4 NON-ST ELEVATION MYOCARDIAL INFARCTION (NSTEMI): ICD-10-CM

## 2024-02-13 DIAGNOSIS — R42 DIZZINESS: ICD-10-CM

## 2024-02-13 DIAGNOSIS — R06.02 SHORTNESS OF BREATH: ICD-10-CM

## 2024-02-13 DIAGNOSIS — F17.200 SMOKER: ICD-10-CM

## 2024-02-13 PROBLEM — E11.9 DM (DIABETES MELLITUS), TYPE 2: Status: ACTIVE | Noted: 2024-02-13

## 2024-02-13 PROBLEM — J44.9 CHRONIC OBSTRUCTIVE PULMONARY DISEASE: Status: ACTIVE | Noted: 2024-02-13

## 2024-02-13 PROBLEM — I21.9 MI (MYOCARDIAL INFARCTION): Status: ACTIVE | Noted: 2024-02-13

## 2024-02-13 PROCEDURE — 99204 OFFICE O/P NEW MOD 45 MIN: CPT | Performed by: INTERNAL MEDICINE

## 2024-02-13 PROCEDURE — 3074F SYST BP LT 130 MM HG: CPT | Performed by: INTERNAL MEDICINE

## 2024-02-13 PROCEDURE — 3078F DIAST BP <80 MM HG: CPT | Performed by: INTERNAL MEDICINE

## 2024-02-13 RX ORDER — PANTOPRAZOLE SODIUM 40 MG/1
40 TABLET, DELAYED RELEASE ORAL DAILY
COMMUNITY
Start: 2024-01-27

## 2024-02-13 RX ORDER — TIRZEPATIDE 10 MG/.5ML
10 INJECTION, SOLUTION SUBCUTANEOUS WEEKLY
COMMUNITY
Start: 2024-02-06

## 2024-02-13 RX ORDER — NITROGLYCERIN 0.4 MG/1
TABLET SUBLINGUAL
Qty: 25 TABLET | Refills: 2 | Status: SHIPPED | OUTPATIENT
Start: 2024-02-13

## 2024-02-13 RX ORDER — DOCUSATE SODIUM 100 MG/1
100 CAPSULE, LIQUID FILLED ORAL 2 TIMES DAILY
COMMUNITY

## 2024-02-26 ENCOUNTER — TELEPHONE (OUTPATIENT)
Dept: CARDIOLOGY | Facility: CLINIC | Age: 49
End: 2024-02-26

## 2024-02-26 NOTE — TELEPHONE ENCOUNTER
Caller: [facility]    Relationship:     Best call back number: [phone]    What form or medical record are you requesting: LAST OFFICE NOTE    Who is requesting this form or medical record from you: TAMELA BOWLES    How would you like to receive the form or medical records (pick-up, mail, fax): FAX  If fax, what is the fax number: [phone]  If mail, what is the address:   If pick-up, provide patient with address and location details    Timeframe paperwork needed: ASAP    Additional notes:

## 2024-11-12 RX ORDER — AZITHROMYCIN 250 MG/1
250 TABLET, FILM COATED ORAL DAILY
COMMUNITY

## 2024-11-12 RX ORDER — VONOPRAZAN FUMARATE 26.72 MG/1
20 TABLET ORAL DAILY
COMMUNITY

## 2024-11-13 ENCOUNTER — OFFICE VISIT (OUTPATIENT)
Dept: SURGERY | Facility: CLINIC | Age: 49
End: 2024-11-13
Payer: COMMERCIAL

## 2024-11-13 VITALS
HEIGHT: 67 IN | BODY MASS INDEX: 25.74 KG/M2 | DIASTOLIC BLOOD PRESSURE: 86 MMHG | WEIGHT: 164 LBS | SYSTOLIC BLOOD PRESSURE: 113 MMHG

## 2024-11-13 DIAGNOSIS — K21.9 GASTROESOPHAGEAL REFLUX DISEASE, UNSPECIFIED WHETHER ESOPHAGITIS PRESENT: Primary | ICD-10-CM

## 2024-11-13 RX ORDER — SODIUM CHLORIDE 0.9 % (FLUSH) 0.9 %
10 SYRINGE (ML) INJECTION AS NEEDED
OUTPATIENT
Start: 2024-11-13

## 2024-11-13 RX ORDER — SODIUM CHLORIDE 9 MG/ML
40 INJECTION, SOLUTION INTRAVENOUS AS NEEDED
OUTPATIENT
Start: 2024-11-13

## 2024-11-13 RX ORDER — SODIUM CHLORIDE 0.9 % (FLUSH) 0.9 %
10 SYRINGE (ML) INJECTION EVERY 12 HOURS SCHEDULED
OUTPATIENT
Start: 2024-11-13

## 2024-11-13 NOTE — PROGRESS NOTES
"Date of Service: 2024    Subjective   Adenike Dockery is a 49 y.o. female is being seen for consultation for GERD, intestinal metaplasia of the stomach today at the request of Ted Huertas    Chief complaint: GERD    Adenike Dockery is a 49 y.o.  female smoker who presents my office as a referral from Dr. Huertas.  She has a history of an EGD that demonstrated \"intestinal metaplasia of the antrum\".  There is a question of whether this was in the esophagus or not.  She had been on Voquenza which controls her GERD symptoms.  She ran out of the medication at 1 time and reverted back to the pantoprazole given by Dr. Huertas but this did not control her symptoms.  She is now back on Voquenza.    Patient smokes and drinks soda frequentl  Last EGD was in 2023.  H. pylori negative    Past Medical History:   Diagnosis Date    CAD (coronary artery disease)     COPD (chronic obstructive pulmonary disease)     Diabetes mellitus     Esophageal ulcer     Heart attack     -    Hypertension        Past Surgical History:   Procedure Laterality Date    CARPAL TUNNEL RELEASE       SECTION      D & C AND LAPAROSCOPY      TENDON REPAIR           Family History   Problem Relation Age of Onset    Cancer Mother     Diabetes Mother     Emphysema Mother     Heart disease Mother     Hypertension Mother     Diabetes Father     Heart disease Father          Social History     Socioeconomic History    Marital status:    Tobacco Use    Smoking status: Every Day     Current packs/day: 1.00     Average packs/day: 1 pack/day for 29.9 years (29.9 ttl pk-yrs)     Types: Cigarettes     Start date:      Passive exposure: Never    Smokeless tobacco: Never   Vaping Use    Vaping status: Never Used   Substance and Sexual Activity    Alcohol use: No    Drug use: No    Sexual activity: Defer                Review of Systems     14 pt ROS negative except for what is noted in HPI        Objective     Physical Exam:      " "11/13/24  0942   Weight: 74.4 kg (164 lb)    Body mass index is 25.68 kg/m².  Constitution: /86   Ht 170.2 cm (67.01\")   Wt 74.4 kg (164 lb)   BMI 25.68 kg/m²  . No acute distress   Head: Normocephalic, atraumatic.   Eyes: Aligned without strabismus. Conjunctivae noninjected   Ears, Nose, Mouth: , no lesions appreciated   Respiratory: Symmetric chest expansion. No respiratory distress.   Gastrointestinal:  Soft, nontender, nondistender  Skin:  No cyanosis, clubbing or edema bilaterally    Lymphatics: No abnormal cervical or supraclavicular adenopathy  Neurologic: No gross deficits.  Alert and oriented x3  Psychiatric: Normal mood        Adenike Dockery is a 49 y.o.  female smoker with GERD    We discussed lifestyle modifications including smoking cessation and ceasing carbonated beverages.  She is not interested in this  We discussed that if the intestinal metaplasia is within the stomach, rather than the esophagus, it will not require further follow-up.  If she has Rodriguez's esophagus (intestinal metaplasia of the esophagus) this may require further endoscopy follow-ups.  We discussed that if Voquenza is not obtainable, there are more potent PPIs that can be trialed                     This document has been electronically signed by César Olson MD   November 13, 2024 10:31 EST    [facility]  "

## 2024-11-13 NOTE — H&P (VIEW-ONLY)
"Date of Service: 2024    Subjective   Adenike Dockery is a 49 y.o. female is being seen for consultation for GERD, intestinal metaplasia of the stomach today at the request of Ted Huertas    Chief complaint: GERD    Adenike Dockery is a 49 y.o.  female smoker who presents my office as a referral from Dr. Huertas.  She has a history of an EGD that demonstrated \"intestinal metaplasia of the antrum\".  There is a question of whether this was in the esophagus or not.  She had been on Voquenza which controls her GERD symptoms.  She ran out of the medication at 1 time and reverted back to the pantoprazole given by Dr. Huertas but this did not control her symptoms.  She is now back on Voquenza.    Patient smokes and drinks soda frequentl  Last EGD was in 2023.  H. pylori negative    Past Medical History:   Diagnosis Date    CAD (coronary artery disease)     COPD (chronic obstructive pulmonary disease)     Diabetes mellitus     Esophageal ulcer     Heart attack     -    Hypertension        Past Surgical History:   Procedure Laterality Date    CARPAL TUNNEL RELEASE       SECTION      D & C AND LAPAROSCOPY      TENDON REPAIR           Family History   Problem Relation Age of Onset    Cancer Mother     Diabetes Mother     Emphysema Mother     Heart disease Mother     Hypertension Mother     Diabetes Father     Heart disease Father          Social History     Socioeconomic History    Marital status:    Tobacco Use    Smoking status: Every Day     Current packs/day: 1.00     Average packs/day: 1 pack/day for 29.9 years (29.9 ttl pk-yrs)     Types: Cigarettes     Start date:      Passive exposure: Never    Smokeless tobacco: Never   Vaping Use    Vaping status: Never Used   Substance and Sexual Activity    Alcohol use: No    Drug use: No    Sexual activity: Defer                Review of Systems     14 pt ROS negative except for what is noted in HPI        Objective     Physical Exam:      " "11/13/24  0942   Weight: 74.4 kg (164 lb)    Body mass index is 25.68 kg/m².  Constitution: /86   Ht 170.2 cm (67.01\")   Wt 74.4 kg (164 lb)   BMI 25.68 kg/m²  . No acute distress   Head: Normocephalic, atraumatic.   Eyes: Aligned without strabismus. Conjunctivae noninjected   Ears, Nose, Mouth: , no lesions appreciated   Respiratory: Symmetric chest expansion. No respiratory distress.   Gastrointestinal:  Soft, nontender, nondistender  Skin:  No cyanosis, clubbing or edema bilaterally    Lymphatics: No abnormal cervical or supraclavicular adenopathy  Neurologic: No gross deficits.  Alert and oriented x3  Psychiatric: Normal mood        Adenike Dockery is a 49 y.o.  female smoker with GERD    We discussed lifestyle modifications including smoking cessation and ceasing carbonated beverages.  She is not interested in this  We discussed that if the intestinal metaplasia is within the stomach, rather than the esophagus, it will not require further follow-up.  If she has Rodriguez's esophagus (intestinal metaplasia of the esophagus) this may require further endoscopy follow-ups.  We discussed that if Voquenza is not obtainable, there are more potent PPIs that can be trialed                     This document has been electronically signed by César Olson MD   November 13, 2024 10:31 EST    [facility]  "

## 2024-12-09 ENCOUNTER — TELEPHONE (OUTPATIENT)
Dept: SURGERY | Facility: CLINIC | Age: 49
End: 2024-12-09
Payer: COMMERCIAL

## 2024-12-09 NOTE — TELEPHONE ENCOUNTER
You are scheduled for an EGD with Dr. hamm on Dec 12 2024 at 11:30. Arrive at outpatient surgery on the ground floor of the hospital, opposite end of the ER location. Do not eat/drink anything after midnight the night prior to procedure. You must have a  with you on day of surgery. If you have any questions please call the office at [phone].

## 2024-12-12 ENCOUNTER — ANESTHESIA EVENT (OUTPATIENT)
Dept: PERIOP | Facility: HOSPITAL | Age: 49
End: 2024-12-12
Payer: COMMERCIAL

## 2024-12-12 ENCOUNTER — ANESTHESIA (OUTPATIENT)
Dept: PERIOP | Facility: HOSPITAL | Age: 49
End: 2024-12-12
Payer: COMMERCIAL

## 2024-12-12 ENCOUNTER — HOSPITAL ENCOUNTER (OUTPATIENT)
Facility: HOSPITAL | Age: 49
Setting detail: HOSPITAL OUTPATIENT SURGERY
Discharge: HOME | End: 2024-12-12
Attending: SURGERY | Admitting: SURGERY
Payer: COMMERCIAL

## 2024-12-12 VITALS
RESPIRATION RATE: 16 BRPM | DIASTOLIC BLOOD PRESSURE: 66 MMHG | HEART RATE: 62 BPM | WEIGHT: 165 LBS | BODY MASS INDEX: 25.9 KG/M2 | TEMPERATURE: 97.7 F | HEIGHT: 67 IN | OXYGEN SATURATION: 97 % | SYSTOLIC BLOOD PRESSURE: 104 MMHG

## 2024-12-12 DIAGNOSIS — K21.9 GASTROESOPHAGEAL REFLUX DISEASE, UNSPECIFIED WHETHER ESOPHAGITIS PRESENT: ICD-10-CM

## 2024-12-12 PROCEDURE — 25010000002 PROPOFOL 10 MG/ML EMULSION: Performed by: NURSE ANESTHETIST, CERTIFIED REGISTERED

## 2024-12-12 PROCEDURE — 25010000002 LIDOCAINE PF 2% 2 % SOLUTION: Performed by: NURSE ANESTHETIST, CERTIFIED REGISTERED

## 2024-12-12 PROCEDURE — 25010000002 FENTANYL CITRATE (PF) 50 MCG/ML SOLUTION: Performed by: NURSE ANESTHETIST, CERTIFIED REGISTERED

## 2024-12-12 RX ORDER — SODIUM CHLORIDE 9 MG/ML
40 INJECTION, SOLUTION INTRAVENOUS AS NEEDED
Status: DISCONTINUED | OUTPATIENT
Start: 2024-12-12 | End: 2024-12-12 | Stop reason: HOSPADM

## 2024-12-12 RX ORDER — SODIUM CHLORIDE, SODIUM LACTATE, POTASSIUM CHLORIDE, CALCIUM CHLORIDE 600; 310; 30; 20 MG/100ML; MG/100ML; MG/100ML; MG/100ML
125 INJECTION, SOLUTION INTRAVENOUS ONCE
Status: DISCONTINUED | OUTPATIENT
Start: 2024-12-12 | End: 2024-12-12 | Stop reason: HOSPADM

## 2024-12-12 RX ORDER — KETOROLAC TROMETHAMINE 30 MG/ML
30 INJECTION, SOLUTION INTRAMUSCULAR; INTRAVENOUS EVERY 6 HOURS PRN
Status: DISCONTINUED | OUTPATIENT
Start: 2024-12-12 | End: 2024-12-12 | Stop reason: HOSPADM

## 2024-12-12 RX ORDER — FENTANYL CITRATE 50 UG/ML
50 INJECTION, SOLUTION INTRAMUSCULAR; INTRAVENOUS
Status: DISCONTINUED | OUTPATIENT
Start: 2024-12-12 | End: 2024-12-12 | Stop reason: HOSPADM

## 2024-12-12 RX ORDER — LIDOCAINE HYDROCHLORIDE 20 MG/ML
INJECTION, SOLUTION EPIDURAL; INFILTRATION; INTRACAUDAL; PERINEURAL AS NEEDED
Status: DISCONTINUED | OUTPATIENT
Start: 2024-12-12 | End: 2024-12-12 | Stop reason: SURG

## 2024-12-12 RX ORDER — SODIUM CHLORIDE 0.9 % (FLUSH) 0.9 %
10 SYRINGE (ML) INJECTION EVERY 12 HOURS SCHEDULED
Status: DISCONTINUED | OUTPATIENT
Start: 2024-12-12 | End: 2024-12-12 | Stop reason: HOSPADM

## 2024-12-12 RX ORDER — FENTANYL CITRATE 50 UG/ML
INJECTION, SOLUTION INTRAMUSCULAR; INTRAVENOUS AS NEEDED
Status: DISCONTINUED | OUTPATIENT
Start: 2024-12-12 | End: 2024-12-12 | Stop reason: SURG

## 2024-12-12 RX ORDER — SODIUM CHLORIDE 0.9 % (FLUSH) 0.9 %
10 SYRINGE (ML) INJECTION AS NEEDED
Status: DISCONTINUED | OUTPATIENT
Start: 2024-12-12 | End: 2024-12-12 | Stop reason: HOSPADM

## 2024-12-12 RX ORDER — OXYCODONE AND ACETAMINOPHEN 5; 325 MG/1; MG/1
1 TABLET ORAL ONCE AS NEEDED
Status: DISCONTINUED | OUTPATIENT
Start: 2024-12-12 | End: 2024-12-12 | Stop reason: HOSPADM

## 2024-12-12 RX ORDER — MEPERIDINE HYDROCHLORIDE 25 MG/ML
12.5 INJECTION INTRAMUSCULAR; INTRAVENOUS; SUBCUTANEOUS
Status: DISCONTINUED | OUTPATIENT
Start: 2024-12-12 | End: 2024-12-12 | Stop reason: HOSPADM

## 2024-12-12 RX ORDER — MIDAZOLAM HYDROCHLORIDE 1 MG/ML
1 INJECTION, SOLUTION INTRAMUSCULAR; INTRAVENOUS
Status: DISCONTINUED | OUTPATIENT
Start: 2024-12-12 | End: 2024-12-12 | Stop reason: HOSPADM

## 2024-12-12 RX ORDER — PROPOFOL 10 MG/ML
VIAL (ML) INTRAVENOUS AS NEEDED
Status: DISCONTINUED | OUTPATIENT
Start: 2024-12-12 | End: 2024-12-12 | Stop reason: SURG

## 2024-12-12 RX ORDER — ONDANSETRON 2 MG/ML
4 INJECTION INTRAMUSCULAR; INTRAVENOUS AS NEEDED
Status: DISCONTINUED | OUTPATIENT
Start: 2024-12-12 | End: 2024-12-12 | Stop reason: HOSPADM

## 2024-12-12 RX ORDER — IPRATROPIUM BROMIDE AND ALBUTEROL SULFATE 2.5; .5 MG/3ML; MG/3ML
3 SOLUTION RESPIRATORY (INHALATION) ONCE AS NEEDED
Status: DISCONTINUED | OUTPATIENT
Start: 2024-12-12 | End: 2024-12-12 | Stop reason: HOSPADM

## 2024-12-12 RX ADMIN — FENTANYL CITRATE 100 MCG: 50 INJECTION INTRAMUSCULAR; INTRAVENOUS at 12:06

## 2024-12-12 RX ADMIN — LIDOCAINE HYDROCHLORIDE 60 MG: 20 INJECTION, SOLUTION EPIDURAL; INFILTRATION; INTRACAUDAL; PERINEURAL at 12:06

## 2024-12-12 RX ADMIN — PROPOFOL 100 MG: 10 INJECTION, EMULSION INTRAVENOUS at 12:06

## 2024-12-12 RX ADMIN — PROPOFOL 100 MCG/KG/MIN: 10 INJECTION, EMULSION INTRAVENOUS at 12:07

## 2024-12-12 NOTE — ANESTHESIA POSTPROCEDURE EVALUATION
Patient: Adenike Dockery    Procedure Summary       Date: 12/12/24 Room / Location: [facility] OR 09 /  [facility] OR    Anesthesia Start: 1204 Anesthesia Stop: 1215    Procedure: ESOPHAGOGASTRODUODENOSCOPY with biopsy, possible dilation (Esophagus) Diagnosis:       Gastroesophageal reflux disease, unspecified whether esophagitis present      (Gastroesophageal reflux disease, unspecified whether esophagitis present [K21.9])    Surgeons: César Olson MD Provider: Sotero Mccray MD    Anesthesia Type: general ASA Status: 3            Anesthesia Type: general    Vitals  Vitals Value Taken Time   /64 12/12/24 1230   Temp     Pulse 77 12/12/24 1233   Resp     SpO2 98 % 12/12/24 1233   Vitals shown include unfiled device data.        Post Anesthesia Care and Evaluation    Patient location during evaluation: PHASE II  Patient participation: complete - patient participated  Level of consciousness: awake and alert  Pain score: 1  Pain management: adequate    Airway patency: patent  Anesthetic complications: No anesthetic complications  PONV Status: controlled  Cardiovascular status: acceptable  Respiratory status: acceptable  Hydration status: acceptable

## 2024-12-12 NOTE — INTERVAL H&P NOTE
HPI reviewed. 14-point Review of Systems negative except for what is noted in HPI. Physical exam unchanged. Risks and benefits of procedure discussed with the patient, they elect to proceed.

## 2024-12-12 NOTE — ANESTHESIA PREPROCEDURE EVALUATION
Anesthesia Evaluation     Patient summary reviewed and Nursing notes reviewed   no history of anesthetic complications:   NPO Solid Status: > 8 hours  NPO Liquid Status: > 8 hours           Airway   Mallampati: II  TM distance: >3 FB  Neck ROM: full  No difficulty expected  Dental - normal exam     Pulmonary - normal exam   (+) COPD,shortness of breath  Cardiovascular - normal exam    PT is on anticoagulation therapy  Patient on routine beta blocker and Beta blocker given within 24 hours of surgery  Rhythm: regular  Rate: normal    (+) hypertension, valvular problems/murmurs murmur, past MI  >12 months, CAD, hyperlipidemia      Neuro/Psych- negative ROS  GI/Hepatic/Renal/Endo    (+) GERD, PUD, diabetes mellitus    Musculoskeletal (-) negative ROS    Abdominal  - normal exam   Substance History - negative use     OB/GYN negative ob/gyn ROS         Other - negative ROS                     Anesthesia Plan    ASA 3     general   total IV anesthesia  intravenous induction     Anesthetic plan, risks, benefits, and alternatives have been provided, discussed and informed consent has been obtained with: patient.  Pre-procedure education provided  Plan discussed with CRNA.    CODE STATUS:

## 2024-12-13 LAB — REF LAB TEST METHOD: NORMAL

## 2024-12-26 ENCOUNTER — OFFICE VISIT (OUTPATIENT)
Dept: SURGERY | Facility: CLINIC | Age: 49
End: 2024-12-26
Payer: COMMERCIAL

## 2024-12-26 VITALS
WEIGHT: 171 LBS | HEIGHT: 67 IN | BODY MASS INDEX: 26.84 KG/M2 | DIASTOLIC BLOOD PRESSURE: 70 MMHG | SYSTOLIC BLOOD PRESSURE: 101 MMHG

## 2024-12-26 DIAGNOSIS — K21.9 GASTROESOPHAGEAL REFLUX DISEASE, UNSPECIFIED WHETHER ESOPHAGITIS PRESENT: Primary | ICD-10-CM

## 2024-12-26 PROCEDURE — 3078F DIAST BP <80 MM HG: CPT | Performed by: SURGERY

## 2024-12-26 PROCEDURE — 1159F MED LIST DOCD IN RCRD: CPT | Performed by: SURGERY

## 2024-12-26 PROCEDURE — 99212 OFFICE O/P EST SF 10 MIN: CPT | Performed by: SURGERY

## 2024-12-26 PROCEDURE — 1160F RVW MEDS BY RX/DR IN RCRD: CPT | Performed by: SURGERY

## 2024-12-26 PROCEDURE — 3074F SYST BP LT 130 MM HG: CPT | Performed by: SURGERY

## 2024-12-26 NOTE — PROGRESS NOTES
Patient Name:  Adenike Dockery  YOB: 1975  [id number]           General Surgery Office Follow Up    Date of Service: 24    Chief [id number] EGD    Subjective      Adenike Dockery is a 49 y.o.  female smoker who had seen in consultation for GERD and history of intestinal metaplasia of the antrum.  She had been on Voquenza but had controlled her symptoms but had a flare up when she ran out of medications and reverted back to pantoprazole.    The patient smokes and drinks soda frequently.  Most recent upper endoscopy was unremarkable.  Antral biopsy H. pylori negative    Allergy:   Allergies   Allergen Reactions    Keflex [Cephalexin]            PMHx:   Past Medical History:   Diagnosis Date    CAD (coronary artery disease)     COPD (chronic obstructive pulmonary disease)     Diabetes mellitus     Elevated cholesterol     Esophageal ulcer     GERD (gastroesophageal reflux disease)     Heart attack     -    Hypertension          Past Surgical History:  Past Surgical History:   Procedure Laterality Date    CARPAL TUNNEL RELEASE       SECTION      D & C AND LAPAROSCOPY      ENDOSCOPY N/A 2024    Procedure: ESOPHAGOGASTRODUODENOSCOPY with biopsy, possible dilation;  Surgeon: César Olson MD;  Location: [facility];  Service: Gastroenterology;  Laterality: N/A;    TENDON REPAIR           Family History:   Family History   Problem Relation Age of Onset    Cancer Mother     Diabetes Mother     Emphysema Mother     Heart disease Mother     Hypertension Mother     Diabetes Father     Heart disease Father          Social History:  Social History     Socioeconomic History    Marital status:    Tobacco Use    Smoking status: Every Day     Current packs/day: 1.00     Average packs/day: 1 pack/day for 30.0 years (30.0 ttl pk-yrs)     Types: Cigarettes     Start date:      Passive exposure: Never    Smokeless tobacco: Never   Vaping Use    Vaping status: Never Used  "  Substance and Sexual Activity    Alcohol use: No    Drug use: No    Sexual activity: Defer            Review of Systems     14 pt ROS negative except for what is noted in HPI       Objective     Physical Exam:      Vital Signs  /70   Ht 170.2 cm (67.01\")   Wt 77.6 kg (171 lb)   BMI 26.78 kg/m²     Body mass index is 26.78 kg/m².    Physical Exam:      Constitution: /70   Ht 170.2 cm (67.01\")   Wt 77.6 kg (171 lb)   BMI 26.78 kg/m²  . No acute distress.   Head: Normocephalic, atraumatic.   Eyes: Aligned without strabismus. Conjunctivae noninjected   Ears, Nose, Mouth: . No lesions appreciated   Respiratory: Symmetric chest expansion. No respiratory distress.   Gastrointestinal:  soft, nontender, nondistended  Skin:  No cyanosis, clubbing or edema bilaterally   Lymphatics: No abnormal cervical or supraclavicular lymphadenopathy appreciated   Neurologic: No gross deficits. Alert and oriented x3   Psychiatric: normal mood       Results Review: I have personally reviewed all of the recent lab and imaging results available at this time.            Assessment & Plan     Assessment and Plan:  49 y.o.  female smoker with GERD    No evidence of intestinal metaplasia on upper endoscopy  Continue Virginie.  We once again discussed lifestyle modifications including smoking cessation and carbonated beverage cessation.  Until that is completed she would not be a candidate for surgical options though she is not amenable to those at this time.  No need for follow-up upper endoscopy                      This document has been electronically signed by César Olson MD   December 26, 2024 10:55 EST    [facility]        "

## 2025-07-08 ENCOUNTER — TELEPHONE (OUTPATIENT)
Dept: CARDIOLOGY | Facility: CLINIC | Age: 50
End: 2025-07-08
Payer: COMMERCIAL

## 2025-07-08 DIAGNOSIS — I21.4 NON-ST ELEVATION MYOCARDIAL INFARCTION (NSTEMI): ICD-10-CM

## 2025-07-08 DIAGNOSIS — I25.10 CORONARY ARTERY DISEASE DUE TO CALCIFIED CORONARY LESION: ICD-10-CM

## 2025-07-08 DIAGNOSIS — R06.02 SHORTNESS OF BREATH: ICD-10-CM

## 2025-07-08 DIAGNOSIS — E78.5 HYPERLIPIDEMIA LDL GOAL <70: ICD-10-CM

## 2025-07-08 DIAGNOSIS — R00.2 PALPITATIONS: Primary | ICD-10-CM

## 2025-07-08 DIAGNOSIS — R01.1 HEART MURMUR: ICD-10-CM

## 2025-07-08 DIAGNOSIS — I25.84 CORONARY ARTERY DISEASE DUE TO CALCIFIED CORONARY LESION: ICD-10-CM

## 2025-07-08 DIAGNOSIS — R42 DIZZINESS: ICD-10-CM

## 2025-07-08 DIAGNOSIS — I10 ESSENTIAL HYPERTENSION: ICD-10-CM

## 2025-07-08 NOTE — TELEPHONE ENCOUNTER
Caller: Dockery Adenike    Relationship: Self    Best call back number: [phone]    Who is your current provider: DR. FRIEND     Is your current provider offboarding? NO    Who would you like your new provider to be: DR. NELSON    Additional notes: PATIENT CALLED IN TO REQUEST A TRANSFER OF CARE FROM DR. FRIEND IN [place] TO DR. NELSON AT EITHER THE [place] LOCATION OR THE [facility] LOCATION. PATIENT STATED SHE SAW DR. FRIEND ONE TIME AND ENDED UP LEAVING WITHOUT COMPLETING THE VISIT DUE TO A DISAGREEMENT ABOUT HER MEDICATION WITH THE PROVIDER. PATIENT DOESN'T WISH TO GO BACK TO DR. FRIEND AND WOULD LIKE TO RE-ESTABLISH HER CARE WITH DR. NELSON. PATIENT STATED SHE WAS SCHEDULED TO SEE DR. NELSON AFTER BEING REFERRED TO HIM AFTER A HOSPITAL STAY IN 2022 BUT SHE NEVER FOLLOWED UP.

## (undated) DEVICE — Device

## (undated) DEVICE — Device: Brand: DEFENDO AIR/WATER/SUCTION AND BIOPSY VALVE

## (undated) DEVICE — FRCP BX RADJAW4 NDL 2.8 240CM LG OG BX40

## (undated) DEVICE — THE BITE BLOCK MAXI, LATEX FREE STRAP IS USED TO PROTECT THE ENDOSCOPE INSERTION TUBE FROM BEING BITTEN BY THE PATIENT.